# Patient Record
Sex: FEMALE | Race: WHITE | NOT HISPANIC OR LATINO | URBAN - METROPOLITAN AREA
[De-identification: names, ages, dates, MRNs, and addresses within clinical notes are randomized per-mention and may not be internally consistent; named-entity substitution may affect disease eponyms.]

---

## 2018-05-10 NOTE — PATIENT DISCUSSION
Return 2 - 3 weeks, if not better discussed with patient we may need to use steroid injection Kenalog to reduce swelling.

## 2018-05-10 NOTE — PATIENT DISCUSSION
Erythromycin eliazar applied and pt asked to use Tee eliazar QID to eyelid and restart doxycycline 100 mg daily with food. Continue warm compresses and call back if not improved.

## 2018-05-10 NOTE — PATIENT DISCUSSION
Continue: doxycycline hyclate (doxycycline hyclate): tablet: 100 mg 1 tablet once a day as directed by mouth 05-

## 2018-05-10 NOTE — PATIENT DISCUSSION
Continue: erythromycin (erythromycin): ointment: 5 mg/gram (0.5 %) a small amount four times a day as directed on eyelid 05-

## 2018-05-24 NOTE — PATIENT DISCUSSION
Stay on erythromycin eliazar QID OD and Doxycycline QD PO. No warm compresses for next 1-2 days but then can resume.

## 2018-06-07 NOTE — PATIENT DISCUSSION
Continue: erythromycin (erythromycin): ointment: 5 mg/gram (0.5 %) a small amount three times a day as directed on eyelid 05-

## 2018-06-07 NOTE — PATIENT DISCUSSION
pt states doxy hurts her stomach sometimes--told her to eat half of her largest meal of the day then take the pill and then finish the rest of her meal

## 2019-02-12 ENCOUNTER — IMPORTED ENCOUNTER (OUTPATIENT)
Dept: URBAN - METROPOLITAN AREA CLINIC 50 | Facility: CLINIC | Age: 64
End: 2019-02-12

## 2020-06-03 ENCOUNTER — IMPORTED ENCOUNTER (OUTPATIENT)
Dept: URBAN - METROPOLITAN AREA CLINIC 50 | Facility: CLINIC | Age: 65
End: 2020-06-03

## 2020-07-06 ENCOUNTER — IMPORTED ENCOUNTER (OUTPATIENT)
Dept: URBAN - METROPOLITAN AREA CLINIC 50 | Facility: CLINIC | Age: 65
End: 2020-07-06

## 2021-04-17 ASSESSMENT — VISUAL ACUITY
OD_SC: 20/25+2
OD_SC: 20/20
OD_OTHER: 20/25-1.
OD_BAT: 20/30
OD_CC: J1+
OD_OTHER: 20/30. 20/40.
OS_OTHER: 20/25. 20/40.
OS_SC: 20/20
OS_SC: 20/20
OS_OTHER: 20/25.
OS_CC: J1+
OS_BAT: 20/25

## 2021-04-17 ASSESSMENT — TONOMETRY
OS_IOP_MMHG: 15
OD_IOP_MMHG: 14
OS_IOP_MMHG: 14
OD_IOP_MMHG: 15

## 2021-06-01 ENCOUNTER — PREPPED CHART (OUTPATIENT)
Dept: URBAN - METROPOLITAN AREA CLINIC 53 | Facility: CLINIC | Age: 66
End: 2021-06-01

## 2021-06-04 ENCOUNTER — ROUTINE EXAM (OUTPATIENT)
Dept: URBAN - METROPOLITAN AREA CLINIC 53 | Facility: CLINIC | Age: 66
End: 2021-06-04

## 2021-06-04 DIAGNOSIS — Z01.00: ICD-10-CM

## 2021-06-04 DIAGNOSIS — H52.4: ICD-10-CM

## 2021-06-04 PROCEDURE — 92015 DETERMINE REFRACTIVE STATE: CPT

## 2021-06-04 PROCEDURE — 92014 COMPRE OPH EXAM EST PT 1/>: CPT

## 2021-06-04 ASSESSMENT — VISUAL ACUITY
OS_SC: 20/20
OD_GLARE: 20/20
OD_SC: 20/20
OS_GLARE: 20/20
OD_GLARE: 20/25
OD_CC: J1+
OS_GLARE: 20/20
OS_CC: J1+

## 2021-06-04 ASSESSMENT — TONOMETRY
OD_IOP_MMHG: 15
OS_IOP_MMHG: 15

## 2021-06-04 NOTE — PATIENT DISCUSSION
Good ocular health on dilated exam today. Eyeglass prescription given. Patient instructed to call office immediately if sudden changes in vision occur. Emphasized importance of sunglasses and healthy lifestyle. Cardiac

## 2021-06-04 NOTE — PATIENT DISCUSSION
Recommended an office pair of progressive lenses. Advised against driving in them. Reassured her she is not hurting herself wearing OTC readers, it is just adding to the glare she is experiencing.

## 2022-10-11 ENCOUNTER — COMPREHENSIVE EXAM (OUTPATIENT)
Dept: URBAN - METROPOLITAN AREA CLINIC 53 | Facility: CLINIC | Age: 67
End: 2022-10-11

## 2022-10-11 DIAGNOSIS — Z01.00: ICD-10-CM

## 2022-10-11 DIAGNOSIS — H43.813: ICD-10-CM

## 2022-10-11 DIAGNOSIS — H25.13: ICD-10-CM

## 2022-10-11 PROCEDURE — 92014 COMPRE OPH EXAM EST PT 1/>: CPT

## 2022-10-11 ASSESSMENT — VISUAL ACUITY
OS_GLARE: 20/30
OU_CC: J1+ @ 16IN
OS_SC: 20/20-2
OS_GLARE: 20/25
OD_GLARE: 20/30
OD_GLARE: 20/25
OD_CC: 20/25
OD_SC: 20/25
OS_CC: 20/20-1

## 2022-10-11 ASSESSMENT — TONOMETRY
OD_IOP_MMHG: 12
OS_IOP_MMHG: 10

## 2023-10-11 ENCOUNTER — COMPREHENSIVE EXAM (OUTPATIENT)
Dept: URBAN - METROPOLITAN AREA CLINIC 53 | Facility: CLINIC | Age: 68
End: 2023-10-11

## 2023-10-11 DIAGNOSIS — H25.13: ICD-10-CM

## 2023-10-11 DIAGNOSIS — H43.813: ICD-10-CM

## 2023-10-11 DIAGNOSIS — H52.4: ICD-10-CM

## 2023-10-11 PROCEDURE — 92014 COMPRE OPH EXAM EST PT 1/>: CPT

## 2023-10-11 ASSESSMENT — KERATOMETRY
OD_K2POWER_DIOPTERS: 45.50
OS_AXISANGLE_DEGREES: 113
OD_K1POWER_DIOPTERS: 45.00
OS_AXISANGLE2_DEGREES: 23
OD_AXISANGLE2_DEGREES: 160
OD_AXISANGLE_DEGREES: 70
OS_K1POWER_DIOPTERS: 44.50
OS_K2POWER_DIOPTERS: 45.25

## 2023-10-11 ASSESSMENT — VISUAL ACUITY
OD_GLARE: 20/25
OU_CC: J1+@16
OS_GLARE: 20/20
OS_CC: 20/25
OD_CC: 20/25
OD_GLARE: 20/25
OS_GLARE: 20/20

## 2023-10-11 ASSESSMENT — TONOMETRY
OS_IOP_MMHG: 15
OD_IOP_MMHG: 15

## 2024-12-06 ENCOUNTER — COMPREHENSIVE EXAM (OUTPATIENT)
Age: 69
End: 2024-12-06

## 2024-12-06 DIAGNOSIS — H52.4: ICD-10-CM

## 2024-12-06 DIAGNOSIS — H35.363: ICD-10-CM

## 2024-12-06 DIAGNOSIS — H25.13: ICD-10-CM

## 2024-12-06 PROCEDURE — 92015 DETERMINE REFRACTIVE STATE: CPT

## 2024-12-06 PROCEDURE — 92134 CPTRZ OPH DX IMG PST SGM RTA: CPT

## 2024-12-06 PROCEDURE — 99214 OFFICE O/P EST MOD 30 MIN: CPT
